# Patient Record
Sex: MALE | Race: WHITE | URBAN - METROPOLITAN AREA
[De-identification: names, ages, dates, MRNs, and addresses within clinical notes are randomized per-mention and may not be internally consistent; named-entity substitution may affect disease eponyms.]

---

## 2020-08-25 ENCOUNTER — DOCTOR'S OFFICE (OUTPATIENT)
Dept: URBAN - METROPOLITAN AREA CLINIC 137 | Facility: CLINIC | Age: 25
Setting detail: OPHTHALMOLOGY
End: 2020-08-25
Payer: COMMERCIAL

## 2020-08-25 DIAGNOSIS — H52.223: ICD-10-CM

## 2020-08-25 PROCEDURE — 92002 INTRM OPH EXAM NEW PATIENT: CPT | Performed by: OPTOMETRIST

## 2020-08-25 ASSESSMENT — REFRACTION_AUTOREFRACTION
OD_CYLINDER: -0.50
OD_AXIS: 165
OS_SPHERE: +0.50
OS_AXIS: 15
OS_CYLINDER: -0.50
OD_SPHERE: +0.50

## 2020-08-25 ASSESSMENT — REFRACTION_MANIFEST
OS_AXIS: 165
OD_AXIS: 15
OS_SPHERE: PL
OD_SPHERE: PL
OS_CYLINDER: -0.25
OD_VA1: 20/20
OD_CYLINDER: -0.25
OS_VA1: 20/20

## 2020-08-25 ASSESSMENT — VISUAL ACUITY
OS_BCVA: 20/20
OD_BCVA: 20/20-1

## 2020-08-25 ASSESSMENT — SPHEQUIV_DERIVED
OS_SPHEQUIV: 0.25
OD_SPHEQUIV: 0.25

## 2020-08-25 ASSESSMENT — CONFRONTATIONAL VISUAL FIELD TEST (CVF)
OD_FINDINGS: FULL
OS_FINDINGS: FULL

## 2022-11-18 ENCOUNTER — OFFICE VISIT (OUTPATIENT)
Dept: PODIATRY | Facility: CLINIC | Age: 27
End: 2022-11-18

## 2022-11-18 VITALS
HEIGHT: 72 IN | HEART RATE: 75 BPM | SYSTOLIC BLOOD PRESSURE: 121 MMHG | DIASTOLIC BLOOD PRESSURE: 84 MMHG | BODY MASS INDEX: 29.07 KG/M2 | WEIGHT: 214.6 LBS | OXYGEN SATURATION: 99 %

## 2022-11-18 DIAGNOSIS — L03.039 PARONYCHIA OF GREAT TOE: Primary | ICD-10-CM

## 2022-11-18 RX ORDER — CEPHALEXIN 500 MG/1
500 CAPSULE ORAL EVERY 8 HOURS SCHEDULED
Qty: 30 CAPSULE | Refills: 1 | Status: SHIPPED | OUTPATIENT
Start: 2022-11-18 | End: 2022-11-28

## 2022-11-18 RX ORDER — FLUOXETINE 20 MG/1
40 TABLET ORAL DAILY
COMMUNITY

## 2022-11-18 RX ORDER — LACOSAMIDE 100 MG/1
100 TABLET ORAL 2 TIMES DAILY
COMMUNITY
Start: 2022-11-09

## 2022-11-18 NOTE — PROGRESS NOTES
Assessment/Plan:    The patient's clinical examination is consistent with a paronychia of the lateral nail fold of the right hallux  The lateral nail border is visible and does not appear to be impinging on the soft tissues  The patient was started on a ten-day course of cephalexin 500 mg 3 times a day  Recommend follow-up in 2 weeks  Diagnoses and all orders for this visit:    Paronychia of great toe  -     cephalexin (KEFLEX) 500 mg capsule; Take 1 capsule (500 mg total) by mouth every 8 (eight) hours for 10 days    Other orders  -     Fluoxetine HCl, PMDD, 20 MG TABS; Take 40 mg by mouth daily  -     lacosamide (VIMPAT) 100 mg tablet; Take 100 mg by mouth 2 (two) times a day          Subjective:      Patient ID: Kushal Esteban is a 33 y o  male  The patient presents today with a chief complaint of a possible ingrown right great toenail  The patient notes swelling and redness along the outside of his right great toe that has been present for several weeks  At one point, did note some drainage emanating from the lateral nail fold  His mother is present with him in the exam room  The following portions of the patient's history were reviewed and updated as appropriate: allergies, current medications, past family history, past medical history, past social history, past surgical history and problem list       PAST MEDICAL HISTORY:  Past Medical History:   Diagnosis Date   • No known health problems        PAST SURGICAL HISTORY:  History reviewed  No pertinent surgical history       ALLERGIES:  Sulfa antibiotics    MEDICATIONS:  Current Outpatient Medications   Medication Sig Dispense Refill   • cephalexin (KEFLEX) 500 mg capsule Take 1 capsule (500 mg total) by mouth every 8 (eight) hours for 10 days 30 capsule 1   • Fluoxetine HCl, PMDD, 20 MG TABS Take 40 mg by mouth daily     • lacosamide (VIMPAT) 100 mg tablet Take 100 mg by mouth 2 (two) times a day       No current facility-administered medications for this visit  SOCIAL HISTORY:  Social History     Socioeconomic History   • Marital status: Single     Spouse name: None   • Number of children: None   • Years of education: None   • Highest education level: None   Occupational History   • None   Tobacco Use   • Smoking status: Never   • Smokeless tobacco: Never   Vaping Use   • Vaping Use: Never used   Substance and Sexual Activity   • Alcohol use: Never   • Drug use: Never   • Sexual activity: None   Other Topics Concern   • None   Social History Narrative   • None     Social Determinants of Health     Financial Resource Strain: Not on file   Food Insecurity: Not on file   Transportation Needs: Not on file   Physical Activity: Not on file   Stress: Not on file   Social Connections: Not on file   Intimate Partner Violence: Not on file   Housing Stability: Not on file        Review of Systems   Constitutional: Negative for chills and fever  HENT: Negative for ear pain and sore throat  Eyes: Negative for pain and visual disturbance  Respiratory: Negative for cough and shortness of breath  Cardiovascular: Negative for chest pain and palpitations  Gastrointestinal: Negative for abdominal pain and vomiting  Genitourinary: Negative for dysuria and hematuria  Musculoskeletal: Negative for arthralgias and back pain  Skin: Negative for color change and rash  Neurological: Negative for seizures and syncope  Psychiatric/Behavioral: Negative  All other systems reviewed and are negative  Objective:      /84 (BP Location: Right arm, Patient Position: Sitting, Cuff Size: Standard)   Pulse 75   Ht 6' (1 829 m)   Wt 97 3 kg (214 lb 9 6 oz)   SpO2 99%   BMI 29 10 kg/m²          Physical Exam  Vitals reviewed  Constitutional:       Appearance: Normal appearance  HENT:      Head: Normocephalic and atraumatic        Nose: Nose normal    Eyes:      Conjunctiva/sclera: Conjunctivae normal       Pupils: Pupils are equal, round, and reactive to light  Cardiovascular:      Pulses:           Dorsalis pedis pulses are 2+ on the left side  Posterior tibial pulses are 2+ on the left side  Pulmonary:      Effort: Pulmonary effort is normal    Feet:      Comments: There is some mild localized erythema to the lateral nail fold of the right hallux with no significant tenderness to palpation; there is no active drainage nor purulence noted; the the left hallux appears stable and without signs of infection  Skin:     General: Skin is warm  Capillary Refill: Capillary refill takes less than 2 seconds  Neurological:      General: No focal deficit present  Mental Status: He is alert and oriented to person, place, and time  Psychiatric:         Mood and Affect: Mood normal          Behavior: Behavior normal          Thought Content:  Thought content normal

## 2022-11-18 NOTE — LETTER
November 18, 2022     Good Ferrer PA-C  [address]    Patient: Tavo Pabon   YOB: 1995   Date of Visit: 11/18/2022       Dear Dr Jess Huff:    Thank you for referring Rishi Lin to me for evaluation  Below are my notes for this consultation  If you have questions, please do not hesitate to call me  I look forward to following your patient along with you  Sincerely,        Deborah Church DPM        CC: No Recipients  Steven Denton  11/18/2022 12:41 PM  Signed  Assessment/Plan:    The patient's clinical examination is consistent with a paronychia of the lateral nail fold of the right hallux  The lateral nail border is visible and does not appear to be impinging on the soft tissues  The patient was started on a ten-day course of cephalexin 500 mg 3 times a day  Recommend follow-up in 2 weeks  Diagnoses and all orders for this visit:    Paronychia of great toe  -     cephalexin (KEFLEX) 500 mg capsule; Take 1 capsule (500 mg total) by mouth every 8 (eight) hours for 10 days    Other orders  -     Fluoxetine HCl, PMDD, 20 MG TABS; Take 40 mg by mouth daily  -     lacosamide (VIMPAT) 100 mg tablet; Take 100 mg by mouth 2 (two) times a day         Subjective:     Patient ID: Tavo Pabon is a 33 y o  male  The patient presents today with a chief complaint of a possible ingrown right great toenail  The patient notes swelling and redness along the outside of his right great toe that has been present for several weeks  At one point, did note some drainage emanating from the lateral nail fold  His mother is present with him in the exam room        The following portions of the patient's history were reviewed and updated as appropriate: allergies, current medications, past family history, past medical history, past social history, past surgical history and problem list       PAST MEDICAL HISTORY:  Past Medical History: Diagnosis Date   • No known health problems        PAST SURGICAL HISTORY:  History reviewed  No pertinent surgical history  ALLERGIES:  Sulfa antibiotics    MEDICATIONS:  Current Outpatient Medications   Medication Sig Dispense Refill   • cephalexin (KEFLEX) 500 mg capsule Take 1 capsule (500 mg total) by mouth every 8 (eight) hours for 10 days 30 capsule 1   • Fluoxetine HCl, PMDD, 20 MG TABS Take 40 mg by mouth daily     • lacosamide (VIMPAT) 100 mg tablet Take 100 mg by mouth 2 (two) times a day       No current facility-administered medications for this visit  SOCIAL HISTORY:  Social History     Socioeconomic History   • Marital status: Single     Spouse name: None   • Number of children: None   • Years of education: None   • Highest education level: None   Occupational History   • None   Tobacco Use   • Smoking status: Never   • Smokeless tobacco: Never   Vaping Use   • Vaping Use: Never used   Substance and Sexual Activity   • Alcohol use: Never   • Drug use: Never   • Sexual activity: None   Other Topics Concern   • None   Social History Narrative   • None     Social Determinants of Health     Financial Resource Strain: Not on file   Food Insecurity: Not on file   Transportation Needs: Not on file   Physical Activity: Not on file   Stress: Not on file   Social Connections: Not on file   Intimate Partner Violence: Not on file   Housing Stability: Not on file        Review of Systems   Constitutional: Negative for chills and fever  HENT: Negative for ear pain and sore throat  Eyes: Negative for pain and visual disturbance  Respiratory: Negative for cough and shortness of breath  Cardiovascular: Negative for chest pain and palpitations  Gastrointestinal: Negative for abdominal pain and vomiting  Genitourinary: Negative for dysuria and hematuria  Musculoskeletal: Negative for arthralgias and back pain  Skin: Negative for color change and rash     Neurological: Negative for seizures and syncope  Psychiatric/Behavioral: Negative  All other systems reviewed and are negative  Objective:      /84 (BP Location: Right arm, Patient Position: Sitting, Cuff Size: Standard)   Pulse 75   Ht 6' (1 829 m)   Wt 97 3 kg (214 lb 9 6 oz)   SpO2 99%   BMI 29 10 kg/m²         Physical Exam  Vitals reviewed  Constitutional:       Appearance: Normal appearance  HENT:      Head: Normocephalic and atraumatic  Nose: Nose normal    Eyes:      Conjunctiva/sclera: Conjunctivae normal       Pupils: Pupils are equal, round, and reactive to light  Cardiovascular:      Pulses:           Dorsalis pedis pulses are 2+ on the left side  Posterior tibial pulses are 2+ on the left side  Pulmonary:      Effort: Pulmonary effort is normal    Feet:      Comments: There is some mild localized erythema to the lateral nail fold of the right hallux with no significant tenderness to palpation; there is no active drainage nor purulence noted; the the left hallux appears stable and without signs of infection  Skin:     General: Skin is warm  Capillary Refill: Capillary refill takes less than 2 seconds  Neurological:      General: No focal deficit present  Mental Status: He is alert and oriented to person, place, and time  Psychiatric:         Mood and Affect: Mood normal          Behavior: Behavior normal          Thought Content:  Thought content normal

## 2022-12-02 ENCOUNTER — OFFICE VISIT (OUTPATIENT)
Dept: PODIATRY | Facility: CLINIC | Age: 27
End: 2022-12-02

## 2022-12-02 VITALS
DIASTOLIC BLOOD PRESSURE: 78 MMHG | BODY MASS INDEX: 28.74 KG/M2 | WEIGHT: 212.2 LBS | HEIGHT: 72 IN | HEART RATE: 63 BPM | OXYGEN SATURATION: 99 % | SYSTOLIC BLOOD PRESSURE: 130 MMHG

## 2022-12-02 DIAGNOSIS — L85.3 XEROSIS CUTIS: Primary | ICD-10-CM

## 2022-12-02 DIAGNOSIS — L03.031 PARONYCHIA OF GREAT TOE OF RIGHT FOOT: ICD-10-CM

## 2022-12-02 RX ORDER — AMMONIUM LACTATE 12 G/100G
LOTION TOPICAL DAILY
Qty: 400 G | Refills: 6 | Status: SHIPPED | OUTPATIENT
Start: 2022-12-02

## 2022-12-02 RX ORDER — DOXYCYCLINE 100 MG/1
100 TABLET ORAL DAILY
Qty: 10 TABLET | Refills: 0 | Status: SHIPPED | OUTPATIENT
Start: 2022-12-02 | End: 2022-12-12

## 2022-12-03 NOTE — PROGRESS NOTES
Assessment/Plan:     The patient's clinical examination today is consistent with improved paronychia of the right great toe  I did gently debride the length of the hallucal nail plates today to decrease the abutment and jamming of the nail plate to the proximal nail fold in his shoe gear  In an effort to make it easier for Kash Bueno to take his medications on a consistent basis, I have switched him to a different antibiotic with once daily dosing  He was prescribed doxycycline 100 mg daily for 10 days  I also prescribed ammonium lactate lotion for application to the areas of the dry skin on his feet at bedtime  Recommend follow-up in 14 days  Diagnoses and all orders for this visit:    Xerosis cutis  -     ammonium lactate (LAC-HYDRIN) 12 % lotion; Apply topically daily    Paronychia of great toe of right foot  -     doxycycline (ADOXA) 100 MG tablet; Take 1 tablet (100 mg total) by mouth daily for 10 days          Subjective:     Patient ID: Deysi Campuzano is a 33 y o  male  The patient presents today with his mother in the exam room for follow-up of a right great toe paronychia  The patient admits to not taking his antibiotic on a consistent basis in missing any doses  He also complains of dry skin to the plantar aspect of his right foot which causes him to itch and pick resulting in a small skin tear to the plantar right forefoot  Review of Systems   Constitutional: Negative for chills and fever  HENT: Negative for ear pain and sore throat  Eyes: Negative for pain and visual disturbance  Respiratory: Negative for cough and shortness of breath  Cardiovascular: Negative for chest pain and palpitations  Gastrointestinal: Negative for abdominal pain and vomiting  Genitourinary: Negative for dysuria and hematuria  Musculoskeletal: Negative for arthralgias and back pain  Skin: Negative for color change and rash  Neurological: Negative for seizures and syncope  Psychiatric/Behavioral: Negative  All other systems reviewed and are negative  Objective:     Physical Exam  Vitals reviewed  Constitutional:       Appearance: Normal appearance  HENT:      Head: Normocephalic and atraumatic  Nose: Nose normal    Eyes:      Conjunctiva/sclera: Conjunctivae normal       Pupils: Pupils are equal, round, and reactive to light  Cardiovascular:      Pulses:           Dorsalis pedis pulses are 2+ on the right side  Posterior tibial pulses are 2+ on the right side  Pulmonary:      Effort: Pulmonary effort is normal    Feet:      Comments: There is reduced erythema and edema to the proximal nail fold of the right hallucal nail plate with no tenderness to palpation in no drainage    There is dry scaling skin to the plantar aspect the patient's right forefoot where there is a skin tear present as well; it is noninfected  Skin:     General: Skin is warm  Capillary Refill: Capillary refill takes less than 2 seconds  Neurological:      General: No focal deficit present  Mental Status: He is alert and oriented to person, place, and time  Psychiatric:         Mood and Affect: Mood normal          Behavior: Behavior normal          Thought Content:  Thought content normal